# Patient Record
Sex: MALE | URBAN - METROPOLITAN AREA
[De-identification: names, ages, dates, MRNs, and addresses within clinical notes are randomized per-mention and may not be internally consistent; named-entity substitution may affect disease eponyms.]

---

## 2018-01-24 ENCOUNTER — APPOINTMENT (OUTPATIENT)
Age: 33
Setting detail: DERMATOLOGY
End: 2018-01-29

## 2018-01-24 DIAGNOSIS — D22 MELANOCYTIC NEVI: ICD-10-CM

## 2018-01-24 DIAGNOSIS — L81.4 OTHER MELANIN HYPERPIGMENTATION: ICD-10-CM

## 2018-01-24 DIAGNOSIS — L82.1 OTHER SEBORRHEIC KERATOSIS: ICD-10-CM

## 2018-01-24 DIAGNOSIS — L83 ACANTHOSIS NIGRICANS: ICD-10-CM

## 2018-01-24 DIAGNOSIS — Z71.89 OTHER SPECIFIED COUNSELING: ICD-10-CM

## 2018-01-24 DIAGNOSIS — D49.2 NEOPLASM OF UNSPECIFIED BEHAVIOR OF BONE, SOFT TISSUE, AND SKIN: ICD-10-CM

## 2018-01-24 PROBLEM — D22.62 MELANOCYTIC NEVI OF LEFT UPPER LIMB, INCLUDING SHOULDER: Status: ACTIVE | Noted: 2018-01-24

## 2018-01-24 PROBLEM — D22.5 MELANOCYTIC NEVI OF TRUNK: Status: ACTIVE | Noted: 2018-01-24

## 2018-01-24 PROBLEM — D22.61 MELANOCYTIC NEVI OF RIGHT UPPER LIMB, INCLUDING SHOULDER: Status: ACTIVE | Noted: 2018-01-24

## 2018-01-24 PROBLEM — D22.39 MELANOCYTIC NEVI OF OTHER PARTS OF FACE: Status: ACTIVE | Noted: 2018-01-24

## 2018-01-24 PROCEDURE — 11100: CPT

## 2018-01-24 PROCEDURE — OTHER OTHER: OTHER

## 2018-01-24 PROCEDURE — OTHER REASSURANCE: OTHER

## 2018-01-24 PROCEDURE — OTHER BIOPSY BY SHAVE METHOD: OTHER

## 2018-01-24 PROCEDURE — 99203 OFFICE O/P NEW LOW 30 MIN: CPT | Mod: 25

## 2018-01-24 PROCEDURE — OTHER MIPS QUALITY: OTHER

## 2018-01-24 PROCEDURE — OTHER IN-HOUSE DISPENSING PHARMACY: OTHER

## 2018-01-24 PROCEDURE — OTHER COUNSELING: OTHER

## 2018-01-24 ASSESSMENT — LOCATION ZONE DERM
LOCATION ZONE: NECK
LOCATION ZONE: TRUNK
LOCATION ZONE: FACE
LOCATION ZONE: ARM

## 2018-01-24 ASSESSMENT — LOCATION DETAILED DESCRIPTION DERM
LOCATION DETAILED: LEFT INFERIOR CENTRAL MALAR CHEEK
LOCATION DETAILED: STERNUM
LOCATION DETAILED: RIGHT VENTRAL PROXIMAL FOREARM
LOCATION DETAILED: LEFT SUPERIOR MEDIAL BUCCAL CHEEK
LOCATION DETAILED: LEFT VENTRAL DISTAL FOREARM
LOCATION DETAILED: RIGHT CLAVICULAR NECK
LOCATION DETAILED: RIGHT MEDIAL INFERIOR CHEST
LOCATION DETAILED: RIGHT INFERIOR POSTERIOR NECK
LOCATION DETAILED: SUPERIOR THORACIC SPINE
LOCATION DETAILED: LEFT VENTRAL PROXIMAL FOREARM

## 2018-01-24 ASSESSMENT — LOCATION SIMPLE DESCRIPTION DERM
LOCATION SIMPLE: LEFT FOREARM
LOCATION SIMPLE: UPPER BACK
LOCATION SIMPLE: POSTERIOR NECK
LOCATION SIMPLE: RIGHT ANTERIOR NECK
LOCATION SIMPLE: CHEST
LOCATION SIMPLE: LEFT CHEEK
LOCATION SIMPLE: RIGHT FOREARM

## 2018-01-24 NOTE — PROCEDURE: IN-HOUSE DISPENSING PHARMACY
Product 8 Application Directions: APPLY PEA SIZE AMOUNT TO FACE QHS\\nLot# [id number]\\nExp 05/25/17 Product 8 Application Directions: APPLY PEA SIZE AMOUNT TO FACE QHS\\nLot# [id number]\\nExp 05/25/17

## 2018-01-24 NOTE — PROCEDURE: OTHER
Detail Level: Zone
Other (Free Text): has annual physical with pcp\\nnot diabetic\\noverweight
Note Text (......Xxx Chief Complaint.): This diagnosis correlates with the

## 2018-01-24 NOTE — PROCEDURE: IN-HOUSE DISPENSING PHARMACY
Product 1 Application Directions: Apply pea size amt to entire face QHS\\n\\nLot# [id number]\\nExp 05/30/2018

## 2018-01-24 NOTE — PROCEDURE: IN-HOUSE DISPENSING PHARMACY
Product 10 Application Directions: Apply pea size amount to face QHS\\nLot# [id number]\\nExp: 05/30/2018 Product 10 Application Directions: Apply pea size amount to face QHS\\nLot# [id number]\\nExp: 05/30/2018

## 2018-01-24 NOTE — PROCEDURE: IN-HOUSE DISPENSING PHARMACY
Product 6 Application Directions: Apply pea size amount to face QHS\\n\\nLOT# [id number]\\nExp 06/01/18 Product 6 Application Directions: Apply pea size amount to face QHS\\n\\nLOT# [id number]\\nExp 06/01/18

## 2018-01-24 NOTE — PROCEDURE: BIOPSY BY SHAVE METHOD
Cryotherapy Text: The wound bed was treated with cryotherapy after the biopsy was performed.
Additional Anesthesia Volume In Cc (Will Not Render If 0): 0
Consent: Written consent was obtained and risks were reviewed including but not limited to scarring, infection, bleeding, scabbing, incomplete removal, nerve damage and allergy to anesthesia.
Bill For Surgical Tray: no
Anesthesia Type: 1% lidocaine with 1:100,000 epinephrine and a 1:10 solution of 8.4% sodium bicarbonate
Electrodesiccation And Curettage Text: The wound bed was treated with electrodesiccation and curettage after the biopsy was performed.
Detail Level: Detailed
Wound Care: Vaseline
Curettage Text: The wound bed was treated with curettage after the biopsy was performed.
Hemostasis: Electrocautery and Aluminum Chloride
Size Of Lesion In Cm: 0.7
Notification Instructions: Patient will be notified of biopsy results. However, patient instructed to call the office if not contacted within 2 weeks.
Electrodesiccation Text: The wound bed was treated with electrodesiccation after the biopsy was performed.
Billing Type: Third-Party Bill
Biopsy Type: H and E
Dressing: pressure dressing
Type Of Destruction Used: Electrodesiccation
Post-Care Instructions: I reviewed with the patient in detail post-care instructions. Patient is to keep the biopsy site dry overnight, and then apply bacitracin twice daily until healed. Patient may apply hydrogen peroxide soaks to remove any crusting.
Anesthesia Volume In Cc (Will Not Render If 0): 2
Biopsy Method: Double edge Personna blades
Silver Nitrate Text: The wound bed was treated with silver nitrate after the biopsy was performed.
Render Post-Care Instructions In Note?: yes

## 2018-01-24 NOTE — PROCEDURE: MIPS QUALITY
Quality 226: Preventive Care And Screening: Tobacco Use: Screening And Cessation Intervention: Patient screened for tobacco and is a smoker AND received Cessation Counseling
Detail Level: Zone
Quality 110: Preventive Care And Screening: Influenza Immunization: Influenza Immunization Administered during Influenza season
Quality 431: Preventive Care And Screening: Unhealthy Alcohol Use - Screening: Patient screened for unhealthy alcohol use using a single question and scores less than 2 times per year

## 2018-01-24 NOTE — PROCEDURE: IN-HOUSE DISPENSING PHARMACY
Product 12 Application Directions: Apply to the face twice a day\\n\\nLot: [id number]\\nExp:9/28/2017 Product 12 Application Directions: Apply to the face twice a day\\n\\nLot: [id number]\\nExp:9/28/2017

## 2018-01-24 NOTE — PROCEDURE: IN-HOUSE DISPENSING PHARMACY
Product 5 Application Directions: Apply a pea-size amount to face at bedtime \\n\\nLot: [id number]\\nExp: 05/30/2018

## 2018-01-24 NOTE — PROCEDURE: IN-HOUSE DISPENSING PHARMACY
Product 4 Application Directions: Apply to the pigmented areas at bedtime \\n\\nLot: [id number]\\nExp: 06/01/2018 Product 4 Application Directions: Apply to the pigmented areas at bedtime \\n\\nLot: [id number]\\nExp: 06/01/2018

## 2018-01-24 NOTE — PROCEDURE: IN-HOUSE DISPENSING PHARMACY
Product 7 Application Directions: Apply to face QD\\nLot# [id number]\\nEXP 05/25/17 Product 7 Application Directions: Apply to face QD\\nLot# [id number]\\nEXP 05/25/17

## 2018-12-03 ENCOUNTER — APPOINTMENT (OUTPATIENT)
Age: 33
Setting detail: DERMATOLOGY
End: 2018-12-07

## 2018-12-03 DIAGNOSIS — L72.8 OTHER FOLLICULAR CYSTS OF THE SKIN AND SUBCUTANEOUS TISSUE: ICD-10-CM

## 2018-12-03 DIAGNOSIS — L738 OTHER SPECIFIED DISEASES OF HAIR AND HAIR FOLLICLES: ICD-10-CM

## 2018-12-03 DIAGNOSIS — L663 OTHER SPECIFIED DISEASES OF HAIR AND HAIR FOLLICLES: ICD-10-CM

## 2018-12-03 PROBLEM — L02.221 FURUNCLE OF ABDOMINAL WALL: Status: ACTIVE | Noted: 2018-12-03

## 2018-12-03 PROCEDURE — OTHER PRESCRIPTION: OTHER

## 2018-12-03 PROCEDURE — OTHER COUNSELING: OTHER

## 2018-12-03 PROCEDURE — OTHER DEFER: OTHER

## 2018-12-03 PROCEDURE — OTHER TREATMENT REGIMEN: OTHER

## 2018-12-03 PROCEDURE — 99213 OFFICE O/P EST LOW 20 MIN: CPT

## 2018-12-03 RX ORDER — CLINDAMYCIN PHOSPHATE 10 MG/ML
LOTION TOPICAL
Qty: 1 | Refills: 2 | Status: ERX | COMMUNITY
Start: 2018-12-03

## 2018-12-03 ASSESSMENT — LOCATION SIMPLE DESCRIPTION DERM: LOCATION SIMPLE: ABDOMEN

## 2018-12-03 ASSESSMENT — LOCATION ZONE DERM: LOCATION ZONE: TRUNK

## 2018-12-03 ASSESSMENT — LOCATION DETAILED DESCRIPTION DERM: LOCATION DETAILED: PERIUMBILICAL SKIN

## 2018-12-03 NOTE — PROCEDURE: TREATMENT REGIMEN
Detail Level: Simple
Otc Regimen: BPO wash, in skin folds
Plan: Advised patient to watch area, if it regrows we can excise it out

## 2018-12-03 NOTE — HPI: SKIN LESION
What Type Of Note Output Would You Prefer (Optional)?: Standard Output
How Severe Is Your Skin Lesion?: mild
Has Your Skin Lesion Been Treated?: not been treated
Is This A New Presentation, Or A Follow-Up?: Skin Lesion
Additional History: Pt states

## 2020-08-06 ENCOUNTER — APPOINTMENT (OUTPATIENT)
Age: 35
Setting detail: DERMATOLOGY
End: 2020-08-11

## 2020-08-06 DIAGNOSIS — L64.8 OTHER ANDROGENIC ALOPECIA: ICD-10-CM

## 2020-08-06 DIAGNOSIS — L21.8 OTHER SEBORRHEIC DERMATITIS: ICD-10-CM

## 2020-08-06 DIAGNOSIS — L91.8 OTHER HYPERTROPHIC DISORDERS OF THE SKIN: ICD-10-CM

## 2020-08-06 PROCEDURE — 99214 OFFICE O/P EST MOD 30 MIN: CPT

## 2020-08-06 PROCEDURE — OTHER PRESCRIPTION: OTHER

## 2020-08-06 PROCEDURE — OTHER TREATMENT REGIMEN: OTHER

## 2020-08-06 PROCEDURE — OTHER ADDITIONAL NOTES: OTHER

## 2020-08-06 PROCEDURE — OTHER COUNSELING: OTHER

## 2020-08-06 PROCEDURE — OTHER MIPS QUALITY: OTHER

## 2020-08-06 RX ORDER — HYDROCORTISONE 25 MG/G
CREAM TOPICAL
Qty: 1 | Refills: 0 | Status: ERX | COMMUNITY
Start: 2020-08-06

## 2020-08-06 RX ORDER — KETOCONAZOLE 20 MG/G
CREAM TOPICAL
Qty: 1 | Refills: 0 | Status: ERX | COMMUNITY
Start: 2020-08-06

## 2020-08-06 ASSESSMENT — LOCATION ZONE DERM
LOCATION ZONE: EAR
LOCATION ZONE: EYELID
LOCATION ZONE: SCALP

## 2020-08-06 ASSESSMENT — LOCATION SIMPLE DESCRIPTION DERM
LOCATION SIMPLE: LEFT SUPERIOR EYELID
LOCATION SIMPLE: RIGHT SUPERIOR EYELID
LOCATION SIMPLE: RIGHT EAR
LOCATION SIMPLE: LEFT EAR
LOCATION SIMPLE: ANTERIOR SCALP

## 2020-08-06 ASSESSMENT — LOCATION DETAILED DESCRIPTION DERM
LOCATION DETAILED: MID-FRONTAL SCALP
LOCATION DETAILED: RIGHT ANTERIOR EARLOBE
LOCATION DETAILED: LEFT ANTERIOR EARLOBE
LOCATION DETAILED: RIGHT LATERAL SUPERIOR EYELID
LOCATION DETAILED: LEFT MEDIAL SUPERIOR EYELID

## 2020-08-06 NOTE — PROCEDURE: TREATMENT REGIMEN
Detail Level: Simple
Initiate Treatment: ketoconazole 2 % topical cream \\nSig: Apply to affected areas (ears) BID for two weeks, then decrease to once a day for maintenance\\n\\nhydrocortisone 2.5 % topical cream \\nSig: Apply to affected area (ears) twice a day for two weeks then as needed for itching\\n\\n*patient advised to apply Ketoconazole cream first, then layer Hydrocortisone cream over top
Otc Regimen: OTC Rogaine foam daily

## 2020-08-06 NOTE — HPI: RASH
What Type Of Note Output Would You Prefer (Optional)?: Standard Output
How Severe Is Your Rash?: mild
Is This A New Presentation, Or A Follow-Up?: Rash
Additional History: Patient states the rash on his ears started two months ago, he thought it could be irritation from sweat from working out. He used OTC Clotrimazole cream twice a day for two weeks, which helped the itching but did not clear the rash.